# Patient Record
Sex: MALE | Race: WHITE | Employment: FULL TIME | URBAN - METROPOLITAN AREA
[De-identification: names, ages, dates, MRNs, and addresses within clinical notes are randomized per-mention and may not be internally consistent; named-entity substitution may affect disease eponyms.]

---

## 2017-01-06 ENCOUNTER — HOSPITAL ENCOUNTER (OUTPATIENT)
Age: 32
Discharge: HOME | End: 2017-01-06
Attending: EMERGENCY MEDICINE
Payer: COMMERCIAL

## 2017-01-06 VITALS
OXYGEN SATURATION: 100 % | SYSTOLIC BLOOD PRESSURE: 145 MMHG | TEMPERATURE: 98 F | HEART RATE: 115 BPM | RESPIRATION RATE: 20 BRPM | DIASTOLIC BLOOD PRESSURE: 90 MMHG

## 2017-01-06 DIAGNOSIS — S61.012A: Primary | ICD-10-CM

## 2017-01-06 PROCEDURE — 99202 OFFICE O/P NEW SF 15 MIN: CPT

## 2017-01-06 RX ORDER — GINSENG 100 MG
CAPSULE ORAL ONCE
Status: COMPLETED | OUTPATIENT
Start: 2017-01-06 | End: 2017-01-06

## 2017-01-06 NOTE — ED PROVIDER NOTES
Patient Seen in: 5 [facility]    History   Patient presents with:  Laceration Abrasion (integumentary)    Stated Complaint: LEFT THUMB LAC    HPI    Patient is a 80-year-old male with no significant past medical history pre Impression:  Laceration of left thumb with delay in treatment, initial encounter  (primary encounter diagnosis)    Disposition:  Discharge    Follow-up:  Agnes Francisco DO  [address] [place] [id number]      As needed      M

## 2020-12-14 ENCOUNTER — TELEPHONE (OUTPATIENT)
Dept: SCHEDULING | Age: 35
End: 2020-12-14

## (undated) NOTE — ED AVS SNAPSHOT
[facility] in [address] [address] [place]    Phone:  [phone]    Fax:  Elan Álvarez   MRN: [id number]    Department:  [facility] in [address] [place] [address]   Date of Visit: related to the care you received in our Immediate Care. Please call our [facility] at [phone]. Your Immediate Care team is here to serve you. You are our top priority. You were examined and treated today on an urgent basis only.   This was not I certified that I have received a copy of the aftercare instructions; that these instructions have been explained to me; all questions pertaining to these instructions have been answered in a satisfactory manner.         [email or web address] your Zip Code and Date of Birth to complete the sign-up process. If you do not sign up before the expiration date, you must request a new code.     Your unique [employer] Access Code: [email or web address]  Expires: 3/7/2017  8:19 AM    If you have questions, you can ca